# Patient Record
Sex: FEMALE | Race: WHITE | Employment: OTHER | URBAN - METROPOLITAN AREA
[De-identification: names, ages, dates, MRNs, and addresses within clinical notes are randomized per-mention and may not be internally consistent; named-entity substitution may affect disease eponyms.]

---

## 2023-03-01 ENCOUNTER — HOSPITAL ENCOUNTER (EMERGENCY)
Age: 73
Discharge: HOME | End: 2023-03-01
Attending: EMERGENCY MEDICINE | Admitting: EMERGENCY MEDICINE
Payer: MEDICARE

## 2023-03-01 ENCOUNTER — APPOINTMENT (OUTPATIENT)
Dept: CT IMAGING | Age: 73
End: 2023-03-01
Payer: MEDICARE

## 2023-03-01 VITALS
OXYGEN SATURATION: 100 % | DIASTOLIC BLOOD PRESSURE: 51 MMHG | WEIGHT: 122 LBS | HEART RATE: 95 BPM | TEMPERATURE: 98.9 F | HEIGHT: 62 IN | RESPIRATION RATE: 20 BRPM | SYSTOLIC BLOOD PRESSURE: 104 MMHG | BODY MASS INDEX: 22.45 KG/M2

## 2023-03-01 DIAGNOSIS — K50.119 CROHN'S DISEASE OF LARGE INTESTINE WITH COMPLICATION (HCC): ICD-10-CM

## 2023-03-01 DIAGNOSIS — R10.9 ACUTE ABDOMINAL PAIN: Primary | ICD-10-CM

## 2023-03-01 LAB
ALBUMIN SERPL-MCNC: 3.9 G/DL (ref 3.2–4.6)
ALBUMIN/GLOB SERPL: 1.3 (ref 0.4–1.6)
ALP SERPL-CCNC: 187 U/L (ref 45–117)
ALT SERPL-CCNC: 91 U/L (ref 13–61)
ANION GAP SERPL CALC-SCNC: 14 MMOL/L (ref 2–11)
APPEARANCE UR: ABNORMAL
AST SERPL-CCNC: 196 U/L (ref 15–37)
BACTERIA URNS QL MICRO: ABNORMAL /HPF
BASOPHILS # BLD: 0 K/UL (ref 0–0.2)
BASOPHILS NFR BLD: 0 % (ref 0–2)
BILIRUB SERPL-MCNC: 0.6 MG/DL (ref 0.2–1.1)
BILIRUB UR QL: ABNORMAL
BUN SERPL-MCNC: 14 MG/DL (ref 7–18)
CALCIUM SERPL-MCNC: 9.4 MG/DL (ref 8.3–10.4)
CASTS URNS QL MICRO: 0 /LPF
CHLORIDE SERPL-SCNC: 101 MMOL/L (ref 98–107)
CO2 SERPL-SCNC: 22 MMOL/L (ref 21–32)
COLOR UR: ABNORMAL
CREAT SERPL-MCNC: 0.8 MG/DL (ref 0.6–1)
CRP SERPL-MCNC: 4.4 MG/DL (ref 0–0.9)
CRYSTALS URNS QL MICRO: 0 /LPF
DIFFERENTIAL METHOD BLD: ABNORMAL
EOSINOPHIL # BLD: 0 K/UL (ref 0–0.8)
EOSINOPHIL NFR BLD: 0 % (ref 0.5–7.8)
EPI CELLS #/AREA URNS HPF: ABNORMAL /HPF
ERYTHROCYTE [DISTWIDTH] IN BLOOD BY AUTOMATED COUNT: 13.5 % (ref 11.9–14.6)
ERYTHROCYTE [SEDIMENTATION RATE] IN BLOOD: 8 MM/HR (ref 0–30)
GLOBULIN SER CALC-MCNC: 3.1 G/DL (ref 2.8–4.5)
GLUCOSE SERPL-MCNC: 145 MG/DL (ref 65–100)
GLUCOSE UR STRIP.AUTO-MCNC: NEGATIVE MG/DL
HCT VFR BLD AUTO: 36.2 % (ref 35.8–46.3)
HGB BLD-MCNC: 12.3 G/DL (ref 11.7–15.4)
HGB UR QL STRIP: NEGATIVE
IMM GRANULOCYTES # BLD AUTO: 0.1 K/UL (ref 0–0.5)
IMM GRANULOCYTES NFR BLD AUTO: 1 % (ref 0–5)
KETONES UR QL STRIP.AUTO: 15 MG/DL
LEUKOCYTE ESTERASE UR QL STRIP.AUTO: ABNORMAL
LIPASE SERPL-CCNC: 20 U/L (ref 13–60)
LYMPHOCYTES # BLD: 0.5 K/UL (ref 0.5–4.6)
LYMPHOCYTES NFR BLD: 5 % (ref 13–44)
MCH RBC QN AUTO: 28.9 PG (ref 26.1–32.9)
MCHC RBC AUTO-ENTMCNC: 34 G/DL (ref 31.4–35)
MCV RBC AUTO: 85.2 FL (ref 82–102)
MONOCYTES # BLD: 0.1 K/UL (ref 0.1–1.3)
MONOCYTES NFR BLD: 1 % (ref 4–12)
MUCOUS THREADS URNS QL MICRO: ABNORMAL /LPF
NEUTS SEG # BLD: 10.5 K/UL (ref 1.7–8.2)
NEUTS SEG NFR BLD: 93 % (ref 43–78)
NITRITE UR QL STRIP.AUTO: NEGATIVE
NRBC # BLD: 0 K/UL (ref 0–0.2)
OTHER OBSERVATIONS: ABNORMAL
PH UR STRIP: 5.5 (ref 5–9)
PLATELET # BLD AUTO: 275 K/UL (ref 150–450)
PMV BLD AUTO: 8.8 FL (ref 9.4–12.3)
POTASSIUM SERPL-SCNC: 3.6 MMOL/L (ref 3.5–5.1)
PROT SERPL-MCNC: 7 G/DL (ref 6.4–8.2)
PROT UR STRIP-MCNC: NEGATIVE MG/DL
RBC # BLD AUTO: 4.25 M/UL (ref 4.05–5.2)
RBC #/AREA URNS HPF: 0 /HPF
SODIUM SERPL-SCNC: 137 MMOL/L (ref 133–143)
SP GR UR REFRACTOMETRY: 1.01 (ref 1–1.02)
UROBILINOGEN UR QL STRIP.AUTO: 0.2 EU/DL (ref 0.2–1)
WBC # BLD AUTO: 11.2 K/UL (ref 4.3–11.1)
WBC URNS QL MICRO: ABNORMAL /HPF

## 2023-03-01 PROCEDURE — 80053 COMPREHEN METABOLIC PANEL: CPT

## 2023-03-01 PROCEDURE — 6370000000 HC RX 637 (ALT 250 FOR IP): Performed by: EMERGENCY MEDICINE

## 2023-03-01 PROCEDURE — 6360000004 HC RX CONTRAST MEDICATION: Performed by: EMERGENCY MEDICINE

## 2023-03-01 PROCEDURE — 74177 CT ABD & PELVIS W/CONTRAST: CPT

## 2023-03-01 PROCEDURE — 99285 EMERGENCY DEPT VISIT HI MDM: CPT

## 2023-03-01 PROCEDURE — 6360000002 HC RX W HCPCS: Performed by: EMERGENCY MEDICINE

## 2023-03-01 PROCEDURE — 83690 ASSAY OF LIPASE: CPT

## 2023-03-01 PROCEDURE — 2580000003 HC RX 258: Performed by: EMERGENCY MEDICINE

## 2023-03-01 PROCEDURE — 86140 C-REACTIVE PROTEIN: CPT

## 2023-03-01 PROCEDURE — 81001 URINALYSIS AUTO W/SCOPE: CPT

## 2023-03-01 PROCEDURE — 85652 RBC SED RATE AUTOMATED: CPT

## 2023-03-01 PROCEDURE — 85025 COMPLETE CBC W/AUTO DIFF WBC: CPT

## 2023-03-01 PROCEDURE — 96374 THER/PROPH/DIAG INJ IV PUSH: CPT

## 2023-03-01 RX ORDER — MORPHINE SULFATE 4 MG/ML
4 INJECTION INTRAVENOUS ONCE
Status: DISCONTINUED | OUTPATIENT
Start: 2023-03-01 | End: 2023-03-01 | Stop reason: HOSPADM

## 2023-03-01 RX ORDER — PREDNISONE 10 MG/1
20 TABLET ORAL
Status: COMPLETED | OUTPATIENT
Start: 2023-03-01 | End: 2023-03-01

## 2023-03-01 RX ORDER — ACETAMINOPHEN 325 MG/1
650 TABLET ORAL EVERY 6 HOURS PRN
COMMUNITY

## 2023-03-01 RX ORDER — ONDANSETRON 2 MG/ML
4 INJECTION INTRAMUSCULAR; INTRAVENOUS ONCE
Status: COMPLETED | OUTPATIENT
Start: 2023-03-01 | End: 2023-03-01

## 2023-03-01 RX ORDER — 0.9 % SODIUM CHLORIDE 0.9 %
100 INTRAVENOUS SOLUTION INTRAVENOUS
Status: COMPLETED | OUTPATIENT
Start: 2023-03-01 | End: 2023-03-01

## 2023-03-01 RX ORDER — PREDNISONE 20 MG/1
20 TABLET ORAL DAILY
Qty: 7 TABLET | Refills: 0 | Status: SHIPPED | OUTPATIENT
Start: 2023-03-01 | End: 2023-03-08

## 2023-03-01 RX ORDER — SODIUM CHLORIDE 0.9 % (FLUSH) 0.9 %
10 SYRINGE (ML) INJECTION
Status: COMPLETED | OUTPATIENT
Start: 2023-03-01 | End: 2023-03-01

## 2023-03-01 RX ORDER — 0.9 % SODIUM CHLORIDE 0.9 %
1000 INTRAVENOUS SOLUTION INTRAVENOUS ONCE
Status: COMPLETED | OUTPATIENT
Start: 2023-03-01 | End: 2023-03-01

## 2023-03-01 RX ADMIN — PREDNISONE 20 MG: 10 TABLET ORAL at 21:05

## 2023-03-01 RX ADMIN — SODIUM CHLORIDE, PRESERVATIVE FREE 10 ML: 5 INJECTION INTRAVENOUS at 19:02

## 2023-03-01 RX ADMIN — SODIUM CHLORIDE 100 ML: 9 INJECTION, SOLUTION INTRAVENOUS at 19:02

## 2023-03-01 RX ADMIN — DIATRIZOATE MEGLUMINE AND DIATRIZOATE SODIUM 15 ML: 660; 100 LIQUID ORAL; RECTAL at 17:38

## 2023-03-01 RX ADMIN — ONDANSETRON 4 MG: 2 INJECTION INTRAMUSCULAR; INTRAVENOUS at 17:37

## 2023-03-01 RX ADMIN — SODIUM CHLORIDE 1000 ML: 9 INJECTION, SOLUTION INTRAVENOUS at 17:36

## 2023-03-01 RX ADMIN — IOPAMIDOL 100 ML: 755 INJECTION, SOLUTION INTRAVENOUS at 19:02

## 2023-03-01 ASSESSMENT — PAIN SCALES - GENERAL
PAINLEVEL_OUTOF10: 0
PAINLEVEL_OUTOF10: 5

## 2023-03-01 ASSESSMENT — ENCOUNTER SYMPTOMS
COLOR CHANGE: 0
SORE THROAT: 0
COUGH: 0
EYE REDNESS: 0
VOMITING: 0
WHEEZING: 0
ABDOMINAL PAIN: 1
DIARRHEA: 0
NAUSEA: 1
SHORTNESS OF BREATH: 0

## 2023-03-01 ASSESSMENT — PAIN - FUNCTIONAL ASSESSMENT: PAIN_FUNCTIONAL_ASSESSMENT: 0-10

## 2023-03-01 NOTE — ED TRIAGE NOTES
Patient ambulatory to triage with abdominal pain, N/v/d that has been going on for a week. Patient has taken tylenol to assist in sleeping. Patient rocking back and forth and moaning in triage.

## 2023-03-01 NOTE — ED PROVIDER NOTES
Emergency Department Provider Note                   PCP:                None None               Age: 72 y.o.      Sex: female     DISPOSITION Decision To Discharge 03/01/2023 08:54:23 PM       ICD-10-CM    1. Acute abdominal pain  R10.9       2. Crohn's disease of large intestine with complication (HCC)  K50.119 Carmen Wright MD, Gastroenterology, [place]          MEDICAL DECISION MAKING  Please see the ED course note for additional MDM documentation    Complexity of Problems Addressed:  1 acute illness that poses a threat to life or bodily function.     Data Reviewed and Analyzed:  She has no external medical records that she has not seen a doctor in decades.  The patients assessment required an independent historian: Daughter    I reviewed all of the lab work that I ordered.  Category 3: Discussion of management or test interpretation.  I discussed her diagnosis and CT findings with on-call for gastroenterology    Risk of Complications and/or Morbidity of Patient Management:  Prescription drug management completed, Patient was discharged risks and benefits of hospitalization were considered, and Discussion with external consultants     Mildred Rhodes is a 72 y.o. female who presents to the Emergency Department with chief complaint of    Chief Complaint   Patient presents with    Abdominal Pain      72-year-old lady presents with concerns about pain in her lower abdomen.  She said she is worried that she may have a bowel obstruction.  She says she is never had a bowel obstruction has never had surgery on her abdomen she imagines this is what it feels like.  She describes it as a cramping and tightening pain.  With her pain she had some nausea but no actual vomiting or diarrhea.  She has never had a colonoscopy.    She denies smoking, drinking, or drugs.    No other associated symptoms.    Elements of this note were created using speech recognition software.  As such, errors of speech recognition may be  present. Review of Systems   Constitutional:  Negative for activity change, chills and fever. HENT:  Negative for congestion and sore throat. Eyes:  Negative for redness and visual disturbance. Respiratory:  Negative for cough, shortness of breath and wheezing. Cardiovascular:  Negative for chest pain and palpitations. Gastrointestinal:  Positive for abdominal pain and nausea. Negative for diarrhea and vomiting. Endocrine: Negative for polydipsia and polyuria. Genitourinary:  Negative for flank pain and hematuria. Musculoskeletal:  Negative for joint swelling and myalgias. Skin:  Negative for color change and rash. Allergic/Immunologic: Negative for immunocompromised state. Neurological:  Negative for dizziness, light-headedness and headaches. Hematological:  Negative for adenopathy. Psychiatric/Behavioral:  Negative for confusion. Vitals signs and nursing note reviewed. Patient Vitals for the past 4 hrs:   Temp Pulse Resp BP SpO2   03/01/23 1710 99 °F (37.2 °C) 100 22 109/64 96 %          Physical Exam  Vitals and nursing note reviewed. Constitutional:       Appearance: Normal appearance. HENT:      Head: Normocephalic and atraumatic. Mouth/Throat:      Mouth: Mucous membranes are moist.   Eyes:      General:         Right eye: No discharge. Left eye: No discharge. Cardiovascular:      Rate and Rhythm: Normal rate and regular rhythm. Pulses: Normal pulses. Pulmonary:      Effort: Pulmonary effort is normal.      Breath sounds: Normal breath sounds. No wheezing. Abdominal:      General: Bowel sounds are normal.      Tenderness: There is abdominal tenderness. There is no guarding or rebound. Comments: Mild diffuse tenderness with no rebound or guarding. Hypoactive bowel sounds. Musculoskeletal:      Right lower leg: No edema. Left lower leg: No edema. Lymphadenopathy:      Cervical: No cervical adenopathy.    Skin:     Coloration: Skin is not jaundiced. Neurological:      General: No focal deficit present. Mental Status: She is alert and oriented to person, place, and time. Mental status is at baseline. Procedures    ED Course as of 03/01/23 2058   Wed Mar 01, 2023   1840 With her pain and symptoms I will get a CT scan of her abdomen to evaluate for possible colitis, obstruction, intra-abdominal mass, or other possibilities. I initially offered her some morphine for pain but she declined. I will treat with IV fluids. [AC]   3627 I reviewed the radiology interpretation of her CT. It does mention a possible partial obstruction due to some strictures. Patient says that she has been entirely pain-free since about 5 minutes after being here. I discussed with her the possibility of being admitted for further evaluation. At this point she would prefer not to be if at all possible. I will contact on-call for GI and discussed with them. Vy Richard   2053 I spoke with on-call for GI. He requested a CRP, sed rate, and a stool calprotectin. I have ordered those tests. Patient does not think that she can give a stool sample here in the ER.   I will start her on 20 of prednisone and discharge her home to follow-up with GI. [AC]      ED Course User Index  [AC] Angel Hicks MD        Orders Placed This Encounter   Procedures    CT ABDOMEN PELVIS W IV CONTRAST Additional Contrast? Oral    CBC with Diff    CMP    Lipase    Urinalysis w rflx microscopic    Urinalysis, Micro    C-Reactive Protein    Sedimentation Rate    Calprotectin Stool    [facility] - Taye June MD, Gastroenterology, [place]    Diet NPO    Saline lock IV        Medications   morphine injection 4 mg (4 mg IntraVENous Not Given 3/1/23 1741)   predniSONE (DELTASONE) tablet 20 mg (has no administration in time range)   diatrizoate meglumine-sodium (GASTROGRAFIN) 66-10 % solution 15 mL (15 mLs Oral Given 3/1/23 1738)   0.9 % sodium chloride bolus (0 mLs IntraVENous Stopped 3/1/23 1853)   ondansetron (ZOFRAN) injection 4 mg (4 mg IntraVENous Given 3/1/23 1737)   iopamidol (ISOVUE-370) 76 % injection 100 mL (100 mLs IntraVENous Given 3/1/23 1902)   0.9 % sodium chloride bolus (0 mLs IntraVENous Stopped 3/1/23 1907)   sodium chloride flush 0.9 % injection 10 mL (10 mLs IntraVENous Given 3/1/23 1902)       New Prescriptions    PREDNISONE (DELTASONE) 20 MG TABLET    Take 1 tablet by mouth daily for 7 days        History reviewed. No pertinent past medical history. History reviewed. No pertinent surgical history. History reviewed. No pertinent family history. Social History     Socioeconomic History    Marital status:      Spouse name: None    Number of children: None    Years of education: None    Highest education level: None   Tobacco Use    Smoking status: Never    Smokeless tobacco: Never   Substance and Sexual Activity    Alcohol use: Never    Drug use: Never        Allergies: Patient has no known allergies. Previous Medications    ACETAMINOPHEN (TYLENOL) 325 MG TABLET    Take 650 mg by mouth every 6 hours as needed for Pain        Results for orders placed or performed during the hospital encounter of 03/01/23   CT ABDOMEN PELVIS W IV CONTRAST Additional Contrast? Oral    Narrative    EXAM: CT OF THE ABDOMEN AND PELVIS WITH IV CONTRAST    INDICATIONS: Diffuse abdominal pain, vomiting, diarrhea    TECHNIQUE: CT scan of the abdomen and pelvis was performed following the   administration of 100 mL Isovue-370 intravenous contrast. CT dose lowering   techniques were used, to include: automated exposure control, adjustment for   patient size, and / or use of iterative reconstruction. COMPARISON: No prior abdominal or pelvic CTs are in the system. FINDINGS:  Bones: No destructive osseous lesions. Lung bases: Unremarkable    ABDOMEN:  Liver: The liver is normal in contour without focal lesion. The portal venous   system is patent.     Gallbladder and Bile Ducts: Unremarkable CT appearance of the gallbladder. There   is no intrahepatic or extrahepatic biliary ductal dilatation. Spleen: There is homogeneous enhancement without focal lesion. Pancreas: The pancreatic parenchyma is unremarkable. There is no pancreatic   ductal dilatation. Adrenals: Unremarkable without nodularity. Kidneys: There is symmetric enhancement without hydronephrosis. No focal lesion   is demonstrated. Vasculature: There are atherosclerotic calcifications throughout the abdomen and   pelvis, without aneurysm. Nodes: Prominent mesenteric lymph nodes are present in the pelvis. Bowel: There is wall thickening and mucosal hyperemia involving the distal and   terminal ileum. There is severe luminal narrowing of the terminal most ileum up   to 7.9 cm in length (axial image 66). A second region of luminal narrowing   measures 3.7 cm (axial image 72). There is upstream small bowel distention. An   unremarkable appendix is identified. Mesentery/Peritoneum: Unremarkable    Soft Tissues: Unremarkable    PELVIS:  Pelvic Organs: There is an ovoid 1.7 x 0.9 x 0.9 cm enhancing structure within   the endometrial canal (sagittal image 63 and axial image 77). The urinary   bladder is unremarkable. Impression    1. Wall thickening of the distal and terminal ileum, with 2 separate regions of   luminal narrowing which are suspicious for chronic stricture. There is upstream   small bowel distention, compatible with some degree of small bowel obstruction. Findings most likely represent sequelae of Crohn's disease. 2. Prominent mesenteric lymph nodes in the pelvis may be related to inflammatory   bowel disease. 3. Ovoid enhancing structure in the endometrial canal. This may represent an   endometrial polyp or fibroid. A malignant process is not entirely excluded. Gynecologic follow-up is recommended.      Maribell Frey M.D.   3/1/2023 8:01:00 PM   CBC with Diff   Result Value Ref Range WBC 11.2 (H) 4.3 - 11.1 K/uL    RBC 4.25 4.05 - 5.20 M/uL    Hemoglobin 12.3 11.7 - 15.4 g/dL    Hematocrit 36.2 35.8 - 46.3 %    MCV 85.2 82.0 - 102.0 FL    MCH 28.9 26.1 - 32.9 PG    MCHC 34.0 31.4 - 35.0 g/dL    RDW 13.5 11.9 - 14.6 %    Platelets 678 399 - 048 K/uL    MPV 8.8 (L) 9.4 - 12.3 FL    nRBC 0.00 0.0 - 0.2 K/uL    Differential Type AUTOMATED      Seg Neutrophils 93 (H) 43 - 78 %    Lymphocytes 5 (L) 13 - 44 %    Monocytes 1 (L) 4.0 - 12.0 %    Eosinophils % 0 (L) 0.5 - 7.8 %    Basophils 0 0.0 - 2.0 %    Immature Granulocytes 1 0.0 - 5.0 %    Segs Absolute 10.5 (H) 1.7 - 8.2 K/UL    Absolute Lymph # 0.5 0.5 - 4.6 K/UL    Absolute Mono # 0.1 0.1 - 1.3 K/UL    Absolute Eos # 0.0 0.0 - 0.8 K/UL    Basophils Absolute 0.0 0.0 - 0.2 K/UL    Absolute Immature Granulocyte 0.1 0.0 - 0.5 K/UL   CMP   Result Value Ref Range    Sodium 137 133 - 143 mmol/L    Potassium 3.6 3.5 - 5.1 mmol/L    Chloride 101 98 - 107 mmol/L    CO2 22 21 - 32 mmol/L    Anion Gap 14 (H) 2 - 11 mmol/L    Glucose 145 (H) 65 - 100 mg/dL    BUN 14 7.0 - 18.0 MG/DL    Creatinine 0.80 0.6 - 1.0 MG/DL    Est, Glom Filt Rate >60 >60 ml/min/1.73m2    Calcium 9.4 8.3 - 10.4 MG/DL    Total Bilirubin 0.6 0.2 - 1.1 MG/DL    ALT 91 (H) 13.0 - 61.0 U/L     (H) 15 - 37 U/L    Alk Phosphatase 187 (H) 45.0 - 117.0 U/L    Total Protein 7.0 6.4 - 8.2 g/dL    Albumin 3.9 3.2 - 4.6 g/dL    Globulin 3.1 2.8 - 4.5 g/dL    Albumin/Globulin Ratio 1.3 0.4 - 1.6     Lipase   Result Value Ref Range    Lipase 20 13 - 60 U/L   Urinalysis w rflx microscopic   Result Value Ref Range    Color, UA DARK YELLOW      Appearance SLIGHTLY CLOUDY      Specific [place], UA 1.015 1.001 - 1.023      pH, Urine 5.5 5.0 - 9.0      Protein, UA Negative NEG mg/dL    Glucose, UA Negative mg/dL    Ketones, Urine 15 (A) NEG mg/dL    Bilirubin Urine SMALL (A) NEG      Blood, Urine Negative NEG      Urobilinogen, Urine 0.2 0.2 - 1.0 EU/dL    Nitrite, Urine Negative NEG Leukocyte Esterase, Urine TRACE (A) NEG     Urinalysis, Micro   Result Value Ref Range    WBC, UA 3-5 0 /hpf    RBC, UA 0 0 /hpf    Epithelial Cells UA 3-5 0 /hpf    BACTERIA, URINE 1+ (H) 0 /hpf    Casts 0 0 /lpf    Crystals 0 0 /LPF    Mucus, UA 2+ (H) 0 /lpf    Other observations RESULTS VERIFIED MANUALLY          CT ABDOMEN PELVIS W IV CONTRAST Additional Contrast? Oral   Final Result      1. Wall thickening of the distal and terminal ileum, with 2 separate regions of    luminal narrowing which are suspicious for chronic stricture. There is upstream    small bowel distention, compatible with some degree of small bowel obstruction. Findings most likely represent sequelae of Crohn's disease. 2. Prominent mesenteric lymph nodes in the pelvis may be related to inflammatory    bowel disease. 3. Ovoid enhancing structure in the endometrial canal. This may represent an    endometrial polyp or fibroid. A malignant process is not entirely excluded. Gynecologic follow-up is recommended. Robbin Watkins M.D.    3/1/2023 8:01:00 PM                        Voice dictation software was used during the making of this note. This software is not perfect and grammatical and other typographical errors may be present. This note has not been completely proofread for errors.         Rik Townsend MD  03/01/23 7346

## 2023-03-02 NOTE — ED NOTES
Report received from CESAR Valentin.  Pt returning back from [address] [place] [address]  03/01/23 8524

## 2023-03-02 NOTE — DISCHARGE INSTRUCTIONS
Please return with any fevers, vomiting, blood in your bowels, worsening symptoms, or additional concerns. Please follow-up with a gastroenterologist for further evaluation.

## 2023-03-02 NOTE — ED NOTES
I have reviewed discharge instructions with the patient and daughter. The patient and daughterverbalized understanding. Patient left ED via Discharge Method: ambulatory to Home with daughter. Opportunity for questions and clarification provided. Patient given 1 scripts. To continue your aftercare when you leave the hospital, you may receive an automated call from our care team to check in on how you are doing. This is a free service and part of our promise to provide the best care and service to meet your aftercare needs.  If you have questions, or wish to unsubscribe from this service please call [phone]. Thank you for Choosing our [address] [place] [address] Emergency Department.         Dianelys Escalera RN  03/01/23 3541

## 2023-03-13 ENCOUNTER — OFFICE VISIT (OUTPATIENT)
Dept: GASTROENTEROLOGY | Age: 73
End: 2023-03-13
Payer: MEDICARE

## 2023-03-13 VITALS
SYSTOLIC BLOOD PRESSURE: 113 MMHG | WEIGHT: 123.6 LBS | HEIGHT: 62 IN | RESPIRATION RATE: 16 BRPM | BODY MASS INDEX: 22.74 KG/M2 | DIASTOLIC BLOOD PRESSURE: 63 MMHG | HEART RATE: 80 BPM | OXYGEN SATURATION: 93 %

## 2023-03-13 DIAGNOSIS — R19.4 BOWEL HABIT CHANGES: ICD-10-CM

## 2023-03-13 DIAGNOSIS — R74.8 ELEVATED LIVER ENZYMES: ICD-10-CM

## 2023-03-13 DIAGNOSIS — R19.4 BOWEL HABIT CHANGES: Primary | ICD-10-CM

## 2023-03-13 DIAGNOSIS — R93.3 ABNORMAL COMPUTED TOMOGRAPHY OF LARGE INTESTINE: Primary | ICD-10-CM

## 2023-03-13 PROCEDURE — G8400 PT W/DXA NO RESULTS DOC: HCPCS | Performed by: INTERNAL MEDICINE

## 2023-03-13 PROCEDURE — G8420 CALC BMI NORM PARAMETERS: HCPCS | Performed by: INTERNAL MEDICINE

## 2023-03-13 PROCEDURE — 1123F ACP DISCUSS/DSCN MKR DOCD: CPT | Performed by: INTERNAL MEDICINE

## 2023-03-13 PROCEDURE — 1090F PRES/ABSN URINE INCON ASSESS: CPT | Performed by: INTERNAL MEDICINE

## 2023-03-13 PROCEDURE — 99204 OFFICE O/P NEW MOD 45 MIN: CPT | Performed by: INTERNAL MEDICINE

## 2023-03-13 PROCEDURE — G8484 FLU IMMUNIZE NO ADMIN: HCPCS | Performed by: INTERNAL MEDICINE

## 2023-03-13 PROCEDURE — 1036F TOBACCO NON-USER: CPT | Performed by: INTERNAL MEDICINE

## 2023-03-13 PROCEDURE — 3017F COLORECTAL CA SCREEN DOC REV: CPT | Performed by: INTERNAL MEDICINE

## 2023-03-13 PROCEDURE — G8427 DOCREV CUR MEDS BY ELIG CLIN: HCPCS | Performed by: INTERNAL MEDICINE

## 2023-03-13 RX ORDER — POLYETHYLENE GLYCOL 3350, SODIUM CHLORIDE, POTASSIUM CHLORIDE, SODIUM BICARBONATE, AND SODIUM SULFATE 240; 5.84; 2.98; 6.72; 22.72 G/4L; G/4L; G/4L; G/4L; G/4L
4000 POWDER, FOR SOLUTION ORAL ONCE
Qty: 4000 ML | Refills: 0 | Status: SHIPPED | OUTPATIENT
Start: 2023-03-13 | End: 2023-03-13

## 2023-03-13 RX ORDER — POLYETHYLENE GLYCOL 3350, SODIUM SULFATE ANHYDROUS, SODIUM BICARBONATE, SODIUM CHLORIDE, POTASSIUM CHLORIDE 236; 22.74; 6.74; 5.86; 2.97 G/4L; G/4L; G/4L; G/4L; G/4L
4 POWDER, FOR SOLUTION ORAL ONCE
Qty: 4000 ML | Refills: 0 | Status: SHIPPED | OUTPATIENT
Start: 2023-03-13 | End: 2023-03-13

## 2023-03-13 ASSESSMENT — ENCOUNTER SYMPTOMS: ABDOMINAL PAIN: 1

## 2023-03-13 NOTE — H&P (VIEW-ONLY)
no rebound. Lymphadenopathy:      Cervical: No cervical adenopathy. Skin:     Coloration: Skin is not jaundiced. Findings: No bruising. Neurological:      General: No focal deficit present. Mental Status: She is alert. Current Outpatient Medications   Medication Sig Dispense Refill    polyethylene glycol-electrolytes (COLYTE) 240 g SOLR solution Take 4,000 mLs by mouth once for 1 dose 4000 mL 0    acetaminophen (TYLENOL) 325 MG tablet Take 650 mg by mouth every 6 hours as needed for Pain       No current facility-administered medications for this visit. No family history on file. No follow-ups on file. An electronic signature was used to authenticate this note.     --Bere Cuadra MD

## 2023-03-13 NOTE — PROGRESS NOTES
Josh Klein (:  1950) is a 67 y.o. female, new patient here for evaluation of the following chief complaint(s):  Follow-up and Crohn's Disease         ASSESSMENT/PLAN:  1. Abnormal computed tomography of large intestine  -     polyethylene glycol-electrolytes (COLYTE) 240 g SOLR solution; Take 4,000 mLs by mouth once for 1 dose, Disp-4000 mL, R-0Normal  2. Bowel habit changes  -     polyethylene glycol-electrolytes (COLYTE) 240 g SOLR solution; Take 4,000 mLs by mouth once for 1 dose, Disp-4000 mL, R-0Normal  3. Elevated liver enzymes    EGD and ileoscopy, liver serology       Subjective   SUBJECTIVE/OBJECTIVE  Patient was seen in the emergency room with acute onset lower quadrant abdominal pain right and left. Because of her symptoms a CT scan was performed, she was noted to have thickening of the distal terminal ileum with 2 areas of separate narrowing there was some lymphadenopathy. I was contacted about her condition. I asked the emergency room physician to order stool calprotectin and sedimentation rate: both were normal.  C-reactive protein was mildly elevated at 4. Count was mildly elevated with a left shift. Her ALT was 91  alkaline phosphatase was 187. 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 No one mentioned to her that she has Crohn disease. Or treated for Crohn disease. No past medical history on file. No past surgical history on file. Not on File       Review of Systems   Gastrointestinal:  Positive for abdominal pain. Objective   Physical Exam  HENT:      Head: Normocephalic. Mouth/Throat:      Mouth: Mucous membranes are moist.   Eyes:      General: No scleral icterus. Cardiovascular:      Rate and Rhythm: Normal rate and regular rhythm. Pulmonary:      Effort: No respiratory distress. Abdominal:      General: There is no distension. Tenderness: There is no abdominal tenderness.  There is no rebound. Lymphadenopathy:      Cervical: No cervical adenopathy. Skin:     Coloration: Skin is not jaundiced. Findings: No bruising. Neurological:      General: No focal deficit present. Mental Status: She is alert. Current Outpatient Medications   Medication Sig Dispense Refill    polyethylene glycol-electrolytes (COLYTE) 240 g SOLR solution Take 4,000 mLs by mouth once for 1 dose 4000 mL 0    acetaminophen (TYLENOL) 325 MG tablet Take 650 mg by mouth every 6 hours as needed for Pain       No current facility-administered medications for this visit. No family history on file. No follow-ups on file. An electronic signature was used to authenticate this note.     --Clarissa Lizarraga MD

## 2023-03-14 LAB
ALBUMIN SERPL-MCNC: 3.4 G/DL (ref 3.2–4.6)
ALBUMIN/GLOB SERPL: 0.9 (ref 0.4–1.6)
ALP SERPL-CCNC: 77 U/L (ref 50–136)
ALT SERPL-CCNC: 21 U/L (ref 12–65)
ANA SER QL: NEGATIVE
AST SERPL-CCNC: 15 U/L (ref 15–37)
BILIRUB DIRECT SERPL-MCNC: <0.1 MG/DL
BILIRUB SERPL-MCNC: 0.7 MG/DL (ref 0.2–1.1)
FERRITIN SERPL-MCNC: 122 NG/ML (ref 8–388)
GLIADIN PEPTIDE IGA SER-ACNC: 26 UNITS (ref 0–19)
GLIADIN PEPTIDE IGG SER-ACNC: 5 UNITS (ref 0–19)
GLOBULIN SER CALC-MCNC: 3.7 G/DL (ref 2.8–4.5)
HAPTOGLOB SERPL-MCNC: 213 MG/DL (ref 30–200)
IGA SERPL-MCNC: 396 MG/DL (ref 64–422)
IRON SERPL-MCNC: 136 UG/DL (ref 35–150)
MITOCHONDRIA M2 IGG SER-ACNC: <20 UNITS (ref 0–20)
PROT SERPL-MCNC: 7.1 G/DL (ref 6.3–8.2)
TTG IGA SER-ACNC: <2 U/ML (ref 0–3)
TTG IGG SER-ACNC: 5 U/ML (ref 0–5)

## 2023-03-15 LAB — SMA IGG SER-ACNC: 10 UNITS (ref 0–19)

## 2023-03-16 ENCOUNTER — PREP FOR PROCEDURE (OUTPATIENT)
Dept: GASTROENTEROLOGY | Age: 73
End: 2023-03-16

## 2023-03-16 PROBLEM — R74.8 ELEVATED LIVER ENZYMES: Status: ACTIVE | Noted: 2023-03-16

## 2023-03-16 PROBLEM — R93.3 ABNORMAL COMPUTED TOMOGRAPHY OF LARGE INTESTINE: Status: ACTIVE | Noted: 2023-03-16

## 2023-03-16 PROBLEM — R19.4 BOWEL HABIT CHANGES: Status: ACTIVE | Noted: 2023-03-16

## 2023-03-16 RX ORDER — SODIUM CHLORIDE 9 MG/ML
25 INJECTION, SOLUTION INTRAVENOUS PRN
Status: CANCELLED | OUTPATIENT
Start: 2023-03-16

## 2023-03-16 RX ORDER — SODIUM CHLORIDE 0.9 % (FLUSH) 0.9 %
5-40 SYRINGE (ML) INJECTION EVERY 12 HOURS SCHEDULED
Status: CANCELLED | OUTPATIENT
Start: 2023-03-16

## 2023-03-16 RX ORDER — SODIUM CHLORIDE 0.9 % (FLUSH) 0.9 %
5-40 SYRINGE (ML) INJECTION PRN
Status: CANCELLED | OUTPATIENT
Start: 2023-03-16

## 2023-03-17 NOTE — PERIOP NOTE
Patient verified name, , and procedure. Type: 1a; abbreviated assessment per anesthesia guidelines    Labs per anesthesia: none    Instructed pt that they will be notified the day before their procedure by the GI Lab for time of arrival if their procedure is [facility] and Pre-op for HOSP[facility] [place] [address] OF [facility]. Arrival times should be called by 5 pm. If no phone is received the patient should contact the GI lab at [phone]. Follow diet and prep instructions per office including NPO status. If patient has NOT received instructions from office patient is advised to call surgeon office, verbalizes understanding. Bath or shower the night before and the am of surgery with non-moisturizing soap. No lotions, oils, powders, cologne on skin. No make up, eye make up or jewelry. Wear loose fitting comfortable, clean clothing. Must have adult present in building the entire time . TAKE ONLY THE FOLLOWING MEDICATION ON THE DAY OF THE PROCEDURE :  none    MEDICATIONS TO HOLD : vitamins and supplements      The following discharge instructions reviewed with patient: medication given during procedure may cause drowsiness for several hours, therefore, do not drive or operate machinery for remainder of the day. You may not drink alcohol on the day of your procedure, please resume regular diet and activity unless otherwise directed. You may experience abdominal distention for several hours that is relieved by the passage of gas. Contact your physician if you have any of the following: fever or chills, severe abdominal pain or excessive amount of bleeding or a large amount when having a bowel movement. Occasional specks of blood with bowel movement would not be unusual.     You may be required to pay a deductible or co-pay on the day of your procedure. You can pre-pay by calling  [phone] if your surgery is at the [facility].

## 2023-03-20 NOTE — PROGRESS NOTES
RN called Pt to confirm appointment time of [phone], arrival time 0825, location,  requirement, and instructions for registration at the hospital.  Pt verbalized understanding.

## 2023-03-21 ENCOUNTER — HOSPITAL ENCOUNTER (OUTPATIENT)
Age: 73
Setting detail: OUTPATIENT SURGERY
Discharge: HOME | End: 2023-03-21
Attending: INTERNAL MEDICINE | Admitting: INTERNAL MEDICINE
Payer: MEDICARE

## 2023-03-21 ENCOUNTER — ANESTHESIA EVENT (OUTPATIENT)
Dept: ENDOSCOPY | Age: 73
End: 2023-03-21
Payer: MEDICARE

## 2023-03-21 ENCOUNTER — ANESTHESIA (OUTPATIENT)
Dept: ENDOSCOPY | Age: 73
End: 2023-03-21
Payer: MEDICARE

## 2023-03-21 VITALS
BODY MASS INDEX: 22.63 KG/M2 | OXYGEN SATURATION: 100 % | DIASTOLIC BLOOD PRESSURE: 71 MMHG | HEIGHT: 62 IN | TEMPERATURE: 96.8 F | SYSTOLIC BLOOD PRESSURE: 121 MMHG | RESPIRATION RATE: 18 BRPM | HEART RATE: 75 BPM | WEIGHT: 123 LBS

## 2023-03-21 DIAGNOSIS — R74.8 ELEVATED LIVER ENZYMES: ICD-10-CM

## 2023-03-21 DIAGNOSIS — R93.3 ABNORMAL COMPUTED TOMOGRAPHY OF LARGE INTESTINE: ICD-10-CM

## 2023-03-21 DIAGNOSIS — R19.4 BOWEL HABIT CHANGES: ICD-10-CM

## 2023-03-21 PROCEDURE — 3609010300 HC COLONOSCOPY W/BIOPSY SINGLE/MULTIPLE: Performed by: INTERNAL MEDICINE

## 2023-03-21 PROCEDURE — 7100000011 HC PHASE II RECOVERY - ADDTL 15 MIN: Performed by: INTERNAL MEDICINE

## 2023-03-21 PROCEDURE — 7100000010 HC PHASE II RECOVERY - FIRST 15 MIN: Performed by: INTERNAL MEDICINE

## 2023-03-21 PROCEDURE — 2500000003 HC RX 250 WO HCPCS: Performed by: REGISTERED NURSE

## 2023-03-21 PROCEDURE — 3700000000 HC ANESTHESIA ATTENDED CARE: Performed by: INTERNAL MEDICINE

## 2023-03-21 PROCEDURE — 3609012400 HC EGD TRANSORAL BIOPSY SINGLE/MULTIPLE: Performed by: INTERNAL MEDICINE

## 2023-03-21 PROCEDURE — 3700000001 HC ADD 15 MINUTES (ANESTHESIA): Performed by: INTERNAL MEDICINE

## 2023-03-21 PROCEDURE — 88312 SPECIAL STAINS GROUP 1: CPT

## 2023-03-21 PROCEDURE — 6360000002 HC RX W HCPCS: Performed by: REGISTERED NURSE

## 2023-03-21 PROCEDURE — 2709999900 HC NON-CHARGEABLE SUPPLY: Performed by: INTERNAL MEDICINE

## 2023-03-21 PROCEDURE — 2580000003 HC RX 258: Performed by: ANESTHESIOLOGY

## 2023-03-21 PROCEDURE — 88305 TISSUE EXAM BY PATHOLOGIST: CPT

## 2023-03-21 RX ORDER — SODIUM CHLORIDE 9 MG/ML
INJECTION, SOLUTION INTRAVENOUS PRN
Status: CANCELLED | OUTPATIENT
Start: 2023-03-21

## 2023-03-21 RX ORDER — SODIUM CHLORIDE 0.9 % (FLUSH) 0.9 %
5-40 SYRINGE (ML) INJECTION PRN
Status: DISCONTINUED | OUTPATIENT
Start: 2023-03-21 | End: 2023-03-21 | Stop reason: HOSPADM

## 2023-03-21 RX ORDER — PROPOFOL 10 MG/ML
INJECTION, EMULSION INTRAVENOUS CONTINUOUS PRN
Status: DISCONTINUED | OUTPATIENT
Start: 2023-03-21 | End: 2023-03-21 | Stop reason: SDUPTHER

## 2023-03-21 RX ORDER — HYDROMORPHONE HYDROCHLORIDE 2 MG/ML
0.5 INJECTION, SOLUTION INTRAMUSCULAR; INTRAVENOUS; SUBCUTANEOUS EVERY 5 MIN PRN
Status: CANCELLED | OUTPATIENT
Start: 2023-03-21

## 2023-03-21 RX ORDER — OXYCODONE HYDROCHLORIDE 5 MG/1
10 TABLET ORAL ONCE
Status: CANCELLED | OUTPATIENT
Start: 2023-03-21

## 2023-03-21 RX ORDER — OXYCODONE HYDROCHLORIDE 5 MG/1
5 TABLET ORAL ONCE
Status: CANCELLED | OUTPATIENT
Start: 2023-03-21 | End: 2023-03-21

## 2023-03-21 RX ORDER — SODIUM CHLORIDE 0.9 % (FLUSH) 0.9 %
5-40 SYRINGE (ML) INJECTION EVERY 12 HOURS SCHEDULED
Status: DISCONTINUED | OUTPATIENT
Start: 2023-03-21 | End: 2023-03-21 | Stop reason: HOSPADM

## 2023-03-21 RX ORDER — SODIUM CHLORIDE 9 MG/ML
INJECTION, SOLUTION INTRAVENOUS PRN
Status: DISCONTINUED | OUTPATIENT
Start: 2023-03-21 | End: 2023-03-21 | Stop reason: HOSPADM

## 2023-03-21 RX ORDER — SODIUM CHLORIDE 0.9 % (FLUSH) 0.9 %
5-40 SYRINGE (ML) INJECTION PRN
Status: CANCELLED | OUTPATIENT
Start: 2023-03-21

## 2023-03-21 RX ORDER — SODIUM CHLORIDE, SODIUM LACTATE, POTASSIUM CHLORIDE, CALCIUM CHLORIDE 600; 310; 30; 20 MG/100ML; MG/100ML; MG/100ML; MG/100ML
INJECTION, SOLUTION INTRAVENOUS CONTINUOUS
Status: CANCELLED | OUTPATIENT
Start: 2023-03-21

## 2023-03-21 RX ORDER — PROCHLORPERAZINE EDISYLATE 5 MG/ML
5 INJECTION INTRAMUSCULAR; INTRAVENOUS
Status: CANCELLED | OUTPATIENT
Start: 2023-03-21 | End: 2023-03-22

## 2023-03-21 RX ORDER — PROPOFOL 10 MG/ML
INJECTION, EMULSION INTRAVENOUS PRN
Status: DISCONTINUED | OUTPATIENT
Start: 2023-03-21 | End: 2023-03-21 | Stop reason: SDUPTHER

## 2023-03-21 RX ORDER — SODIUM CHLORIDE 9 MG/ML
25 INJECTION, SOLUTION INTRAVENOUS PRN
Status: DISCONTINUED | OUTPATIENT
Start: 2023-03-21 | End: 2023-03-21 | Stop reason: HOSPADM

## 2023-03-21 RX ORDER — LIDOCAINE HYDROCHLORIDE 20 MG/ML
INJECTION, SOLUTION EPIDURAL; INFILTRATION; INTRACAUDAL; PERINEURAL PRN
Status: DISCONTINUED | OUTPATIENT
Start: 2023-03-21 | End: 2023-03-21 | Stop reason: SDUPTHER

## 2023-03-21 RX ORDER — LIDOCAINE HYDROCHLORIDE 10 MG/ML
1 INJECTION, SOLUTION INFILTRATION; PERINEURAL ONCE
Status: DISCONTINUED | OUTPATIENT
Start: 2023-03-21 | End: 2023-03-21 | Stop reason: HOSPADM

## 2023-03-21 RX ORDER — DIPHENHYDRAMINE HYDROCHLORIDE 50 MG/ML
12.5 INJECTION INTRAMUSCULAR; INTRAVENOUS
Status: CANCELLED | OUTPATIENT
Start: 2023-03-21 | End: 2023-03-22

## 2023-03-21 RX ORDER — SODIUM CHLORIDE 0.9 % (FLUSH) 0.9 %
5-40 SYRINGE (ML) INJECTION EVERY 12 HOURS SCHEDULED
Status: CANCELLED | OUTPATIENT
Start: 2023-03-21

## 2023-03-21 RX ORDER — SODIUM CHLORIDE, SODIUM LACTATE, POTASSIUM CHLORIDE, CALCIUM CHLORIDE 600; 310; 30; 20 MG/100ML; MG/100ML; MG/100ML; MG/100ML
INJECTION, SOLUTION INTRAVENOUS CONTINUOUS
Status: DISCONTINUED | OUTPATIENT
Start: 2023-03-21 | End: 2023-03-21 | Stop reason: HOSPADM

## 2023-03-21 RX ADMIN — PROPOFOL 50 MG: 10 INJECTION, EMULSION INTRAVENOUS at 11:00

## 2023-03-21 RX ADMIN — PROPOFOL 140 MCG/KG/MIN: 10 INJECTION, EMULSION INTRAVENOUS at 11:00

## 2023-03-21 RX ADMIN — LIDOCAINE HYDROCHLORIDE 50 MG: 20 INJECTION, SOLUTION EPIDURAL; INFILTRATION; INTRACAUDAL; PERINEURAL at 11:00

## 2023-03-21 RX ADMIN — SODIUM CHLORIDE, POTASSIUM CHLORIDE, SODIUM LACTATE AND CALCIUM CHLORIDE: 600; 310; 30; 20 INJECTION, SOLUTION INTRAVENOUS at 10:40

## 2023-03-21 ASSESSMENT — PAIN - FUNCTIONAL ASSESSMENT: PAIN_FUNCTIONAL_ASSESSMENT: NONE - DENIES PAIN

## 2023-03-21 NOTE — ANESTHESIA POSTPROCEDURE EVALUATION
Department of Anesthesiology  Postprocedure Note    Patient: Venita Pham  MRN: [id number]  YOB: 1950  Date of evaluation: 3/21/2023      Procedure Summary     Date: 03/21/23 Room / Location: [facility] ENDO 05 / [facility] ENDOSCOPY    Anesthesia Start: 1057 Anesthesia Stop: 36    Procedures:       COLONOSCOPY WITH BIOPSY (Lower GI Region)      EGD BIOPSY (Upper GI Region) Diagnosis:       Abnormal computed tomography of large intestine      Bowel habit changes      Elevated liver enzymes      (Abnormal computed tomography of large intestine [R93.3])      (Bowel habit changes [R19.4])      (Elevated liver enzymes [R74.8])    Surgeons: Lord Philippe MD Responsible Provider: Dean Mendoza MD    Anesthesia Type: TIVA ASA Status: 2          Anesthesia Type: TIVA    Yan Phase I: Yan Score: 10    Yan Phase II: Yan Score: 10      Anesthesia Post Evaluation    Patient location during evaluation: PACU  Patient participation: complete - patient participated  Level of consciousness: awake and alert  Airway patency: patent  Nausea & Vomiting: no nausea and no vomiting  Complications: no  Cardiovascular status: hemodynamically stable  Respiratory status: acceptable, nonlabored ventilation and spontaneous ventilation  Hydration status: euvolemic  Comments: /71   Pulse 75   Temp 96.8 °F (36 °C) (Temporal)   Resp 18   Ht 5' 2\" (1.575 m)   Wt 123 lb (55.8 kg)   SpO2 100%   BMI 22.50 kg/m²     Multimodal analgesia pain management approach

## 2023-03-21 NOTE — INTERVAL H&P NOTE
Update History & Physical    The patient's History and Physical of March 13,  was reviewed with the patient and I examined the patient. There was celiac antibody serology was positive; will need EGD. The surgical site was confirmed by the patient and me. Plan: The risks, benefits, expected outcome, and alternative to the recommended procedure have been discussed with the patient. Patient understands and wants to proceed with the procedure.      Electronically signed by Jourdan Shook MD on 3/21/2023 at 10:37 AM

## 2023-03-21 NOTE — PROCEDURES
Operative Report    Patient: Melisa Ragland MRN: [id number]      YOB: 1950  Age: 67 y.o. Sex: female            Indications: Abnormal CT scan of the bowel with ileal [email or web address]     Preoperative Evaluation: The patient was evaluated prior to the procedure in the GI lab admission area, the patient ASA was recorded . Consent was obtained from the patient with the risk of perforation bleeding and aspiration. Anesthesia: GINA-per anesthesia    Complications: None; patient tolerated the procedure well. EBL -insignificant      Procedure: The patient was sedated in the left lateral decubitus position. Scope was advanced from the rectum to the cecum. Evaluation was performed to the cecum twice. The scope was withdrawn to the rectum, retroflexed view was performed. The rectal exam was normal.  Preparation was good [place] score of 3/3/3:9 . Findings:   Exam to the cecum. Normal colonic mucosa with normal vascular pattern. There is a tight stricture at the ileocecal valve. Unable to introduce the pediatric colonoscope through the tight stricture. I was able to introduce a biopsy forceps through the stricture and I took a couple of biopsies. No sign of  colitis ,malignancy throughout the colon  . Postoperative Diagnosis: Ileal stricture will require surgery    [id number] Colonoscopy, Flexible; with biopsy, single or multiple      Recommendations:   - Await pathology.       Signed By:  Liane Peres MD     March 21, 2023

## 2023-03-21 NOTE — PROGRESS NOTES
VSS. Discharge instructions reviewed with patient and son and copy of instructions sent home with patient. Questions answered. Patient transferred out via wheelchair by staff. IV discontinued prior to discharge.

## 2023-03-21 NOTE — PROCEDURES
Endoscopy Note          Operative Report    Patient: Tata Iqbal MRN: [id number]      YOB: 1950  Age: 67 y.o. Sex: female            Indications:   Abnormal CT scan of small bowel. Elevated celiac  antibody    Preoperative Evaluation: The patient was evaluated prior to the procedure in the GI lab admission area, the patient ASA was recorded . Consent was obtained from the patient with the risk of perforation bleeding and aspiration. Anesthesia: GINA-per anesthesia    Findings: Normal upper mid and distal esophageal mucosa normal GE junction mild generalized gastritis biopsy from the antrum was taken. Open pylorus. Normal descending duodenal mucosa that was biopsied.     Postoperative Diagnosis: Chronic gastritis    58909 Esophagogastroduodenoscopy, Flexible, transoral; biopsy; single or multiple      Recommendations: Await Pathology      Signed By:  Alley Cast MD     March 21, 2023

## 2023-03-21 NOTE — DISCHARGE INSTRUCTIONS
Gastrointestinal Esophagogastroduodenoscopy (EGD) - Upper Exam Discharge Instructions    1. Call Dr. Anthony Knott at [phone] for any problems or questions. 2. Contact the doctor's office for follow up appointment as directed. 3. Medication may cause drowsiness for several hours, therefore:  Do not drive or operate machinery for remainder of the day. No alcohol today. Do not make any important or legal decisions for 24 hours. Do not sign any legal documents for 24 hours. 5. Ordinarily, you may resume regular diet and activity after exam unless otherwise specified by your physician. 6. For mild soreness in your throat you may use Cepacol throat lozenges or warm salt-water gargles as needed. Gastrointestinal Colonoscopy/Flexible Sigmoidoscopy - Lower Exam Discharge Instructions    Because of air put into your colon during exam, you may experience some abdominal distension, relieved by the passage of gas, for several hours. Contact your physician if you have any of the following:  Excessive amount of bleeding - large amount when having a bowel movement. Occasional specks of blood with bowel movement would not be unusual.  Severe abdominal pain  Fever or Chills    Polyp Removal - follow these additional instructions   Take Metamucil - 1 tablespoon in juice every morning for 3 days, as needed for constipation. Any additional instructions:    Office will call and follow up on pathology results and to schedule any future appointments.

## 2023-03-21 NOTE — ANESTHESIA PRE PROCEDURE
Department of Anesthesiology  Preprocedure Note       Name:  Franny Carmona   Age:  67 y.o.  :  1950                                          MRN:  [id number]         Date:  3/21/2023      Surgeon: Alix Chacon):  Willi Mena MD    Procedure: Procedure(s):  COLORECTAL CANCER SCREENING, NOT HIGH RISK  EGD ESOPHAGOGASTRODUODENOSCOPY    Medications prior to admission:   Prior to Admission medications    Medication Sig Start Date End Date Taking?  Authorizing Provider   Cholecalciferol (VITAMIN D3) 125 MCG (5000 UT) TABS Take by mouth   Yes Historical Provider, MD   Zinc Acetate, Oral, (ZINC ACETATE PO) Take by mouth   Yes Historical Provider, MD   NONFORMULARY Take by mouth daily Metabolic Advance   Yes Historical Provider, MD   acetaminophen (TYLENOL) 325 MG tablet Take 650 mg by mouth every 6 hours as needed for Pain    Historical Provider, MD       Current medications:    Current Facility-Administered Medications   Medication Dose Route Frequency Provider Last Rate Last Admin    lidocaine 1 % injection 1 mL  1 mL IntraDERmal Once Judy Stafford MD        sodium chloride flush 0.9 % injection 5-40 mL  5-40 mL IntraVENous 2 times per day Judy Stafford MD        sodium chloride flush 0.9 % injection 5-40 mL  5-40 mL IntraVENous PRN Judy Stafford MD        0.9 % sodium chloride infusion   IntraVENous PRN Judy Stafford MD        sodium chloride flush 0.9 % injection 5-40 mL  5-40 mL IntraVENous 2 times per day Willi Mena MD        sodium chloride flush 0.9 % injection 5-40 mL  5-40 mL IntraVENous PRN Willi Mena MD        0.9 % sodium chloride infusion  25 mL IntraVENous PRN Willi Mena MD        lactated ringers IV soln infusion   IntraVENous Continuous Judy Stafford  mL/hr at 23 1051 NoRateChange at 23 1051       Allergies:  No Known Allergies    Problem List:    Patient Active Problem List   Diagnosis Code    Abnormal computed tomography of large intestine R93.3   

## 2023-04-17 DIAGNOSIS — K56.609 SBO (SMALL BOWEL OBSTRUCTION) (HCC): Primary | ICD-10-CM

## 2023-04-18 ENCOUNTER — TELEPHONE (OUTPATIENT)
Dept: GASTROENTEROLOGY | Age: 73
End: 2023-04-18

## 2023-04-18 NOTE — TELEPHONE ENCOUNTER
Spoke w/pt to make sure she had MRI enterography time at [facility]  5/9 7:00 am arrival time. NPO after midnight.    Voiced understanding

## 2023-05-09 DIAGNOSIS — K56.609 SMALL BOWEL OBSTRUCTION (HCC): ICD-10-CM

## 2023-05-15 ENCOUNTER — TELEPHONE (OUTPATIENT)
Dept: GASTROENTEROLOGY | Age: 73
End: 2023-05-15

## 2023-05-15 NOTE — TELEPHONE ENCOUNTER
Tried multiple times to call the patient unable to respond. Finally left a message.   She is to call me back to discuss her MRI enterography

## 2023-05-19 ENCOUNTER — TELEPHONE (OUTPATIENT)
Dept: GASTROENTEROLOGY | Age: 73
End: 2023-05-19

## 2023-05-19 NOTE — TELEPHONE ENCOUNTER
Discussed the MRE report with the patient discussed that she had 2 area of stricture  5 cm the other is a 8cm long in distal ileum. Advised surgery consultation to discuss possibility of resection. Patient said that she is symptom-free at this moment and is having good bowel movement and she does not want to pursue surgery at this moment ;she will think about it and call me  back if she decides to go in a different direction. Advised diet changes to avoid hard vegetables uncooked vegetable and chew her food very well to avoid obstruction.

## (undated) DEVICE — NEEDLE SYR 18GA L1.5IN RED PLAS HUB S STL BLNT FILL W/O

## (undated) DEVICE — GAUZE,SPONGE,4"X4",12PLY,WOVEN,NS,LF: Brand: MEDLINE

## (undated) DEVICE — YANKAUER,BULB TIP,W/O VENT,RIGID,STERILE: Brand: MEDLINE

## (undated) DEVICE — CONTAINER FORMALIN PREFILLED 10% NBF 60ML

## (undated) DEVICE — SYRINGE MED 3ML CLR PLAS STD N CTRL LUERLOCK TIP DISP

## (undated) DEVICE — CONNECTOR TBNG OD5-7MM O2 END DISP

## (undated) DEVICE — BLOCK BITE AD 60FR W/ VELC STRP ADDRESSES MOST PT AND

## (undated) DEVICE — SYRINGE, LUER SLIP, STERILE, 60ML: Brand: MEDLINE

## (undated) DEVICE — SYRINGE MED 10ML LUERLOCK TIP W/O SFTY DISP

## (undated) DEVICE — AIRLIFE™ OXYGEN TUBING 7 FEET (2.1 M) CRUSH RESISTANT OXYGEN TUBING, VINYL TIPPED: Brand: AIRLIFE™

## (undated) DEVICE — KENDALL RADIOLUCENT FOAM MONITORING ELECTRODE RECTANGULAR SHAPE: Brand: KENDALL

## (undated) DEVICE — FORCEPS BX L240CM JAW DIA2.8MM L CAP W/ NDL MIC MESH TOOTH

## (undated) DEVICE — LUBE JELLY FOIL PACK 1.4 OZ: Brand: MEDLINE INDUSTRIES, INC.

## (undated) DEVICE — SINGLE PORT MANIFOLD: Brand: NEPTUNE 2

## (undated) DEVICE — CANNULA NSL ORAL AD FOR CAPNOFLEX CO2 O2 AIRLFE